# Patient Record
Sex: FEMALE | Race: WHITE | NOT HISPANIC OR LATINO | URBAN - METROPOLITAN AREA
[De-identification: names, ages, dates, MRNs, and addresses within clinical notes are randomized per-mention and may not be internally consistent; named-entity substitution may affect disease eponyms.]

---

## 2017-05-01 PROBLEM — R06.02 SOB (SHORTNESS OF BREATH): Status: ACTIVE | Noted: 2017-05-01

## 2017-12-06 ENCOUNTER — CLINICAL SUPPORT (OUTPATIENT)
Dept: SMOKING CESSATION | Facility: CLINIC | Age: 72
End: 2017-12-06
Payer: COMMERCIAL

## 2017-12-06 DIAGNOSIS — F17.210 MODERATE CIGARETTE SMOKER (10-19 PER DAY): Primary | ICD-10-CM

## 2017-12-06 PROCEDURE — 99999 PR PBB SHADOW E&M-NEW PATIENT-LVL I: CPT | Mod: PBBFAC,,,

## 2017-12-06 PROCEDURE — 99404 PREV MED CNSL INDIV APPRX 60: CPT | Mod: S$GLB,,,

## 2017-12-06 RX ORDER — LOSARTAN POTASSIUM 25 MG/1
25 TABLET ORAL DAILY
COMMUNITY

## 2017-12-06 RX ORDER — VARENICLINE TARTRATE 0.5 (11)-1
KIT ORAL
Qty: 1 PACKAGE | Refills: 0 | Status: SHIPPED | OUTPATIENT
Start: 2017-12-06

## 2017-12-06 RX ORDER — ASPIRIN 81 MG/1
81 TABLET ORAL DAILY
COMMUNITY

## 2017-12-06 RX ORDER — FAMOTIDINE 20 MG/1
20 TABLET, FILM COATED ORAL 2 TIMES DAILY
COMMUNITY

## 2017-12-06 RX ORDER — NIFEDIPINE 60 MG/1
30 TABLET, EXTENDED RELEASE ORAL DAILY
COMMUNITY

## 2017-12-06 NOTE — PROGRESS NOTES
Patient here for 1st quit attempt. States she is not sure of daily tobacco intake. Reviewed program goals. Agreed to participate in weekly tobacco cessation individual sessions. Will begin the prescribed tobacco cessation medication regimen of Chantix starter pack. Has used Chantix before. Educated patient how to use and side effects of medications.

## 2017-12-06 NOTE — Clinical Note
Patient here for 1st quit attempt. States she is not sure of daily tobacco intake. Estimates about 10 per day and more when stressed.Reviewed program goals. Agreed to participate in weekly tobacco cessation individual sessions. Will begin the prescribed tobacco cessation medication regimen of Chantix starter pack. Has used Chantix before. Educated patient how to use and side effects of medications

## 2017-12-28 ENCOUNTER — TELEPHONE (OUTPATIENT)
Dept: SMOKING CESSATION | Facility: CLINIC | Age: 72
End: 2017-12-28

## 2017-12-28 NOTE — TELEPHONE ENCOUNTER
Called patient to follow up. Was told by daughter patient is in hospital and will call to schedule appointment when she is discharged.

## 2018-06-04 ENCOUNTER — TELEPHONE (OUTPATIENT)
Dept: SMOKING CESSATION | Facility: CLINIC | Age: 73
End: 2018-06-04

## 2018-06-26 ENCOUNTER — CLINICAL SUPPORT (OUTPATIENT)
Dept: SMOKING CESSATION | Facility: CLINIC | Age: 73
End: 2018-06-26
Payer: COMMERCIAL

## 2018-06-26 DIAGNOSIS — F17.200 NICOTINE DEPENDENCE: Primary | ICD-10-CM

## 2018-06-26 PROCEDURE — 99407 BEHAV CHNG SMOKING > 10 MIN: CPT | Mod: S$GLB,,,

## 2018-06-26 NOTE — PROGRESS NOTES
Spoke with patient today in regards to smoking cessation progress 3/6 month phone follow up, states not tobacco free. Patient is not interested in returning to the smoking cessation program at this time due to medical issues. Will call when ready to schedule. Informed patient of benefit period, phone follow up at 1 year, and contact information. Will complete smart form for 3/6 month phone follow up on Quit attempt #1.

## 2018-12-06 ENCOUNTER — TELEPHONE (OUTPATIENT)
Dept: SMOKING CESSATION | Facility: CLINIC | Age: 73
End: 2018-12-06

## 2018-12-11 ENCOUNTER — CLINICAL SUPPORT (OUTPATIENT)
Dept: SMOKING CESSATION | Facility: CLINIC | Age: 73
End: 2018-12-11
Payer: COMMERCIAL

## 2018-12-11 DIAGNOSIS — F17.200 NICOTINE DEPENDENCE: Primary | ICD-10-CM

## 2018-12-11 PROCEDURE — 99406 BEHAV CHNG SMOKING 3-10 MIN: CPT | Mod: S$GLB,,,

## 2018-12-11 NOTE — PROGRESS NOTES
Called pt to f/u on her 12 month smoking cessation quit status. Pt's daughter Ivory stated she remains tobacco free. Informed her of future benefits available, phone follow ups, and contact information if she was to need extra help or support remaining tobacco free. Will complete smart form and resolve quit episode.